# Patient Record
Sex: FEMALE | Race: OTHER | URBAN - METROPOLITAN AREA
[De-identification: names, ages, dates, MRNs, and addresses within clinical notes are randomized per-mention and may not be internally consistent; named-entity substitution may affect disease eponyms.]

---

## 2022-05-24 ENCOUNTER — EMERGENCY VISIT (OUTPATIENT)
Dept: URBAN - METROPOLITAN AREA CLINIC 13 | Facility: CLINIC | Age: 32
End: 2022-05-24

## 2022-05-24 DIAGNOSIS — H18.831: ICD-10-CM

## 2022-05-24 PROCEDURE — 92012 INTRM OPH EXAM EST PATIENT: CPT

## 2022-05-24 ASSESSMENT — VISUAL ACUITY
OU_CC: 20/20
OD_CC: 20/20
OS_CC: 20/20

## 2022-05-24 ASSESSMENT — TONOMETRY
OS_IOP_MMHG: 26
OD_IOP_MMHG: 23

## 2022-07-19 ENCOUNTER — PREPPED CHART (OUTPATIENT)
Dept: URBAN - METROPOLITAN AREA CLINIC 13 | Facility: CLINIC | Age: 32
End: 2022-07-19

## 2022-08-23 ENCOUNTER — PREPPED CHART (OUTPATIENT)
Dept: URBAN - METROPOLITAN AREA CLINIC 13 | Facility: CLINIC | Age: 32
End: 2022-08-23

## 2022-08-23 NOTE — PATIENT DISCUSSION
Continue using her erythromycin once or twice a day only.
RTC if it reoccurs.
Recommended heavy lubrication before bed.
Spoke with pt about what a Cornea erosion is and what it means for it being recurrent.
Female